# Patient Record
Sex: FEMALE | Race: OTHER | HISPANIC OR LATINO | URBAN - METROPOLITAN AREA
[De-identification: names, ages, dates, MRNs, and addresses within clinical notes are randomized per-mention and may not be internally consistent; named-entity substitution may affect disease eponyms.]

---

## 2024-08-01 ENCOUNTER — OUTPATIENT (OUTPATIENT)
Dept: OUTPATIENT SERVICES | Facility: HOSPITAL | Age: 64
LOS: 1 days | End: 2024-08-01
Payer: MEDICAID

## 2024-08-01 VITALS
OXYGEN SATURATION: 97 % | HEART RATE: 82 BPM | SYSTOLIC BLOOD PRESSURE: 121 MMHG | RESPIRATION RATE: 18 BRPM | DIASTOLIC BLOOD PRESSURE: 79 MMHG | WEIGHT: 162.04 LBS | TEMPERATURE: 98 F | HEIGHT: 64 IN

## 2024-08-01 DIAGNOSIS — M75.121 COMPLETE ROTATOR CUFF TEAR OR RUPTURE OF RIGHT SHOULDER, NOT SPECIFIED AS TRAUMATIC: ICD-10-CM

## 2024-08-01 DIAGNOSIS — Z01.818 ENCOUNTER FOR OTHER PREPROCEDURAL EXAMINATION: ICD-10-CM

## 2024-08-01 DIAGNOSIS — Z98.890 OTHER SPECIFIED POSTPROCEDURAL STATES: Chronic | ICD-10-CM

## 2024-08-01 DIAGNOSIS — S46.011A STRAIN OF MUSCLE(S) AND TENDON(S) OF THE ROTATOR CUFF OF RIGHT SHOULDER, INITIAL ENCOUNTER: ICD-10-CM

## 2024-08-01 DIAGNOSIS — I10 ESSENTIAL (PRIMARY) HYPERTENSION: ICD-10-CM

## 2024-08-01 DIAGNOSIS — R73.03 PREDIABETES: ICD-10-CM

## 2024-08-01 DIAGNOSIS — M54.50 LOW BACK PAIN, UNSPECIFIED: ICD-10-CM

## 2024-08-01 DIAGNOSIS — M51.26 OTHER INTERVERTEBRAL DISC DISPLACEMENT, LUMBAR REGION: ICD-10-CM

## 2024-08-01 DIAGNOSIS — M17.0 BILATERAL PRIMARY OSTEOARTHRITIS OF KNEE: ICD-10-CM

## 2024-08-01 NOTE — H&P PST ADULT - PROBLEM SELECTOR PLAN 3
Pt instructed to avoid NSAIDs 1 week before surgery, to take Tylenol as needed for pain.   Pt to follow-up with provider for management.  Stated understanding of instructions given.

## 2024-08-01 NOTE — H&P PST ADULT - NSANTHOSAYNRD_GEN_A_CORE
No. BOY screening performed.  STOP BANG Legend: 0-2 = LOW Risk; 3-4 = INTERMEDIATE Risk; 5-8 = HIGH Risk

## 2024-08-01 NOTE — H&P PST ADULT - HEMATOLOGY/LYMPHATICS
Patient discharged in stable condition to group home staff. Provided with discharge paperwork and home medications returned to patient[facility] All belongings accounted for.   negative

## 2024-08-01 NOTE — H&P PST ADULT - ASSESSMENT
This is a 64 year old  female with PMH of HTN, prediabetes-last HgA1C 6.4 in December 2023, OA of knees, lumbar herniated discs, COVID-19 virus infection in August 2023-fully recovered, who presents with complete rotator cuff tear of right shoulder not traumatic. Pt is scheduled for right shoulder arthroscopy on 8/8/2024.  BOY stop bang score 3. Pt denies history of BOY, never did sleep study.

## 2024-08-01 NOTE — H&P PST ADULT - PROBLEM SELECTOR PLAN 4
Pt c/o low back pain due to herniated disc.  Pt instructed to avoid NSAIDs 1 week before surgery, to take Tylenol as needed for pain.   Pt to follow-up with provider for management.  Stated understanding of instructions given.

## 2024-08-01 NOTE — H&P PST ADULT - HISTORY OF PRESENT ILLNESS
This is a 64 year old female with PMH of HTN, OA of knees, COVID-19 virus infection in August 2023-fully recovered, who presents with c/o right shoulder pain due to complete rotator cuff tear. Pt reports worsening of pain with arm movement and with heavy lifting. Pt for right shoulder arthroscopy on 8/8/2024.     This is a 64 year old  female with PMH of HTN, prediabetes-last HgA1C 6.4 in December 2023, OA of knees, lumbar herniated discs, COVID-19 virus infection in August 2023-fully recovered, who presents with c/o right shoulder pain due to complete rotator cuff tear. Pt reports worsening of pain with arm movement and with heavy lifting. Pt is scheduled for right shoulder arthroscopy on 8/8/2024.

## 2024-08-01 NOTE — H&P PST ADULT - NSICDXPASTMEDICALHX_GEN_ALL_CORE_FT
PAST MEDICAL HISTORY:  Complete rotator cuff tear or rupture of right shoulder, not specified as traumatic     HTN (hypertension)      PAST MEDICAL HISTORY:  Complete rotator cuff tear or rupture of right shoulder, not specified as traumatic     HTN (hypertension)     Lumbar herniated disc     Osteoarthritis of both knees     Prediabetes

## 2024-08-01 NOTE — H&P PST ADULT - PROBLEM SELECTOR PLAN 5
Hemoglobin A1C 6.4 in December 2023-seen on pt's portal-will obtain report from PCP.  Perioperative glucose monitoring and coverage as needed.   Follow-up with PCP for diabetic management.

## 2024-08-01 NOTE — H&P PST ADULT - PROBLEM SELECTOR PLAN 1
Pt is scheduled for right shoulder arthroscopy on 8/8/2024.  BOY stop bang score 3. Pt denies history of BOY, never did sleep study.  Preoperative instructions discussed with pt via Angolan speaking son Harpreet Lentz and pt's son. Uzbek copy of instructions given to pt.   Instructed pt that she will need someone to escort her home after surgery, to notify security when she arrives in the lobby of the hospital that she is here for surgery, not to eat or drink anything after midnight the night before the surgery, to avoid NSAIDs such as Ibuprofen, Motrin, aleve, Advil, naproxen before surgery, to take Tylenol if needed for pain, to report if she has been exposed to any one with any contagious diseases including Covid-19 or if she is exhibiting any symptoms of COVID-19. Instructed about use of Chlorhexidine 4% soap 3 days before surgery including the morning of surgery. Verbalized understanding of instructions given.

## 2024-08-01 NOTE — H&P PST ADULT - MUSCULOSKELETAL COMMENTS
c/o pain in knees, left ankle pain c/o pain in knees, left ankle pain; c/o low back pain due to herniated disc

## 2024-08-01 NOTE — H&P PST ADULT - PROBLEM SELECTOR PLAN 2
Optimized for surgery by PCP.  Instructed to continue antihypertensive medication-Amlodipine and take it with sips of water on day of surgery.   Follow-up with PCP for management.

## 2024-08-01 NOTE — H&P PST ADULT - NSICDXPASTSURGICALHX_GEN_ALL_CORE_FT
PAST SURGICAL HISTORY:  History of umbilical hernia repair      PAST SURGICAL HISTORY:  History of facial surgery     History of umbilical hernia repair

## 2024-08-02 PROCEDURE — G0463: CPT

## 2024-08-08 ENCOUNTER — OUTPATIENT (OUTPATIENT)
Dept: OUTPATIENT SERVICES | Facility: HOSPITAL | Age: 64
LOS: 1 days | End: 2024-08-08
Payer: MEDICAID

## 2024-08-08 VITALS
TEMPERATURE: 98 F | SYSTOLIC BLOOD PRESSURE: 137 MMHG | HEART RATE: 81 BPM | OXYGEN SATURATION: 96 % | HEIGHT: 64 IN | WEIGHT: 162.04 LBS | RESPIRATION RATE: 16 BRPM | DIASTOLIC BLOOD PRESSURE: 79 MMHG

## 2024-08-08 VITALS
SYSTOLIC BLOOD PRESSURE: 105 MMHG | RESPIRATION RATE: 16 BRPM | OXYGEN SATURATION: 97 % | HEART RATE: 80 BPM | DIASTOLIC BLOOD PRESSURE: 60 MMHG | TEMPERATURE: 98 F

## 2024-08-08 DIAGNOSIS — Z98.890 OTHER SPECIFIED POSTPROCEDURAL STATES: Chronic | ICD-10-CM

## 2024-08-08 DIAGNOSIS — M75.121 COMPLETE ROTATOR CUFF TEAR OR RUPTURE OF RIGHT SHOULDER, NOT SPECIFIED AS TRAUMATIC: ICD-10-CM

## 2024-08-08 DIAGNOSIS — Z01.818 ENCOUNTER FOR OTHER PREPROCEDURAL EXAMINATION: ICD-10-CM

## 2024-08-08 LAB
GLUCOSE BLDC GLUCOMTR-MCNC: 108 MG/DL — HIGH (ref 70–99)
GLUCOSE BLDC GLUCOMTR-MCNC: 157 MG/DL — HIGH (ref 70–99)

## 2024-08-08 PROCEDURE — 29827 SHO ARTHRS SRG RT8TR CUF RPR: CPT | Mod: RT

## 2024-08-08 PROCEDURE — 82962 GLUCOSE BLOOD TEST: CPT

## 2024-08-08 PROCEDURE — 0232T NJX PLATELET PLASMA: CPT

## 2024-08-08 PROCEDURE — 29826 SHO ARTHRS SRG DECOMPRESSION: CPT | Mod: RT

## 2024-08-08 PROCEDURE — 29999 UNLISTED PX ARTHROSCOPY: CPT

## 2024-08-08 PROCEDURE — C1713: CPT

## 2024-08-08 DEVICE — IMP OPUS MULTIFIXS 5.5MM: Type: IMPLANTABLE DEVICE | Site: RIGHT | Status: FUNCTIONAL

## 2024-08-08 DEVICE — IMPLANTABLE DEVICE: Type: IMPLANTABLE DEVICE | Site: RIGHT | Status: FUNCTIONAL

## 2024-08-08 RX ORDER — HYDROMORPHONE HCL IN 0.9% NACL 0.2 MG/ML
0.5 PLASTIC BAG, INJECTION (ML) INTRAVENOUS ONCE
Refills: 0 | Status: DISCONTINUED | OUTPATIENT
Start: 2024-08-08 | End: 2024-08-08

## 2024-08-08 RX ORDER — AMLODIPINE BESYLATE 2.5 MG/1
1 TABLET ORAL
Refills: 0 | DISCHARGE

## 2024-08-08 RX ORDER — FENTANYL CITRATE 1200 UG/1
50 LOZENGE ORAL; TRANSMUCOSAL
Refills: 0 | Status: DISCONTINUED | OUTPATIENT
Start: 2024-08-08 | End: 2024-08-08

## 2024-08-08 RX ORDER — ACETAMINOPHEN 500 MG
650 TABLET ORAL EVERY 6 HOURS
Refills: 0 | Status: ACTIVE | OUTPATIENT
Start: 2024-08-08 | End: 2024-08-09

## 2024-08-08 RX ORDER — IBUPROFEN 200 MG
2 TABLET ORAL
Refills: 0 | DISCHARGE

## 2024-08-08 RX ORDER — CELECOXIB 200 MG/1
200 CAPSULE ORAL ONCE
Refills: 0 | Status: COMPLETED | OUTPATIENT
Start: 2024-08-08 | End: 2024-08-08

## 2024-08-08 RX ORDER — NAPROXEN 250 MG
1 TABLET ORAL
Refills: 0 | DISCHARGE

## 2024-08-08 RX ORDER — OXYCODONE HYDROCHLORIDE 30 MG/1
2.5 TABLET ORAL EVERY 4 HOURS
Refills: 0 | Status: DISCONTINUED | OUTPATIENT
Start: 2024-08-08 | End: 2024-08-08

## 2024-08-08 RX ORDER — ONDANSETRON HCL/PF 4 MG/2 ML
4 VIAL (ML) INJECTION EVERY 6 HOURS
Refills: 0 | Status: ACTIVE | OUTPATIENT
Start: 2024-08-08 | End: 2025-07-07

## 2024-08-08 RX ORDER — OXYCODONE AND ACETAMINOPHEN 10; 325 MG/1; MG/1
1 TABLET ORAL
Qty: 0 | Refills: 0 | DISCHARGE

## 2024-08-08 RX ORDER — ACETAMINOPHEN 500 MG
2 TABLET ORAL
Refills: 0 | DISCHARGE

## 2024-08-08 RX ORDER — ACETAMINOPHEN 500 MG
975 TABLET ORAL ONCE
Refills: 0 | Status: COMPLETED | OUTPATIENT
Start: 2024-08-08 | End: 2024-08-08

## 2024-08-08 RX ORDER — OXYCODONE HYDROCHLORIDE 30 MG/1
5 TABLET ORAL EVERY 6 HOURS
Refills: 0 | Status: DISCONTINUED | OUTPATIENT
Start: 2024-08-08 | End: 2024-08-08

## 2024-08-08 RX ORDER — ASPIRIN 500 MG
1 TABLET ORAL
Qty: 0 | Refills: 0 | DISCHARGE

## 2024-08-08 RX ORDER — NALOXEGOL OXALATE 25 MG/1
1 TABLET, FILM COATED ORAL
Qty: 0 | Refills: 0 | DISCHARGE

## 2024-08-08 RX ORDER — ONDANSETRON HCL/PF 4 MG/2 ML
1 VIAL (ML) INJECTION
Qty: 0 | Refills: 0 | DISCHARGE

## 2024-08-08 RX ORDER — FENTANYL CITRATE 1200 UG/1
25 LOZENGE ORAL; TRANSMUCOSAL
Refills: 0 | Status: DISCONTINUED | OUTPATIENT
Start: 2024-08-08 | End: 2024-08-08

## 2024-08-08 RX ADMIN — Medication 4 MILLIGRAM(S): at 13:12

## 2024-08-08 RX ADMIN — FENTANYL CITRATE 50 MICROGRAM(S): 1200 LOZENGE ORAL; TRANSMUCOSAL at 11:56

## 2024-08-08 RX ADMIN — Medication 975 MILLIGRAM(S): at 07:47

## 2024-08-08 RX ADMIN — Medication 0.5 MILLIGRAM(S): at 12:41

## 2024-08-08 RX ADMIN — CELECOXIB 200 MILLIGRAM(S): 200 CAPSULE ORAL at 07:47

## 2024-08-08 RX ADMIN — FENTANYL CITRATE 50 MICROGRAM(S): 1200 LOZENGE ORAL; TRANSMUCOSAL at 12:11

## 2024-08-08 RX ADMIN — Medication 0.5 MILLIGRAM(S): at 12:56

## 2024-08-08 NOTE — ASU DISCHARGE PLAN (ADULT/PEDIATRIC) - PROCEDURE
Right shoulder arthroscopy with rotator cuff repair Right shoulder arthroscopy with rotator cuff repair & biceps tenotomy

## 2024-08-08 NOTE — ASU DISCHARGE PLAN (ADULT/PEDIATRIC) - ASU DC SPECIAL INSTRUCTIONSFT
Keep Dressing clean, dry, and intact   Non Weight Bearing to right upper extremity until f/u with Dr. Waters  Sling in place at all times to right upper extremity   Patient is to follow up with Orthopedic Surgeon, Dr. Waters in office in ONE WEEK at: [phone]    If patient has drainage from the wound, please contact the surgeon's office.   If patient has intractable pain, please contact the surgeon's office.   If excessively warm at the incision site is noted, please contact the surgeon's office.   If redness is noted, especially spreading, please contact the surgeon's office.     If patient has fever over 101F please return to the hospital through the Emergency Room.   If kenyatta blood is saturating the dressing, please return to the hospital through the Emergency Room.  If drainage of fluid or pus is noted, please return to the hospital through the Emergency Room.

## 2024-08-08 NOTE — ASU DISCHARGE PLAN (ADULT/PEDIATRIC) - NS MD DC FALL RISK RISK
For information on Fall & Injury Prevention, visit: [email or web address] OR  [email or web address] OR  https://www.cdc.gov/steadi/patient.html

## 2024-08-08 NOTE — ASU DISCHARGE PLAN (ADULT/PEDIATRIC) - CARE PROVIDER_API CALL
Donnie Waters  Orthopaedic Surgery  [address] [place] [id number]  Phone: [phone]  Fax: [phone]  Follow Up Time: 1 week

## 2024-08-08 NOTE — ASU PATIENT PROFILE, ADULT - NSICDXPASTMEDICALHX_GEN_ALL_CORE_FT
PAST MEDICAL HISTORY:  Complete rotator cuff tear or rupture of right shoulder, not specified as traumatic     HTN (hypertension)     Lumbar herniated disc     Osteoarthritis of both knees     Prediabetes

## 2024-08-08 NOTE — BRIEF OPERATIVE NOTE - NSICDXBRIEFPROCEDURE_GEN_ALL_CORE_FT
PROCEDURES:  Arthroscopy with open repair of rotator cuff of right shoulder 08-Aug-2024 11:35:18  Luisito Neal

## 2025-04-28 NOTE — H&P PST ADULT - NS PRO LACT YNNA
Advance Care Planning    Advance Care Planning Clinical Specialist  Conversation Note    Date of ACP Conversation: 4/28/2025    ACP Clinical Specialist: ALEXANDER AYALA    Referral Date:4/16/2025    Received by: PCP    Conversation Conducted with: Patient with decision making capacity      Current Designated Decision Maker/s:        Care Preferences Communicated    Code Status:  If the patient's heart were to stop beating, in their present state of health, the patient's CPR Preference: Yes, attempt CPR    If their health worsens and it becomes clear that the chance of recovery is unlikely, the patient's CPR Preference: No, allow a natural death (No CPR)     Ventilator:  If the patient, in their present state of health, suddenly became very ill and unable to breathe on their own, the patient desires the use of a ventilator (intubation).    If their health worsens and it becomes clear that the chance of recovery is unlikely, the patient does not desire the use of a ventilator (intubation).    [x] Yes  [] No   Educated Patient / Decision Maker regarding differences between advance directives and portable DNR orders.    Conversation Summary:   Arrived at the patient's residence and obtained verbal consent for home visit obtained. ACP conversation with alert and oriented patient completed. Discussed advance directives, early decision making and goals of care. Patient stated that at end of life she would want comfort-based care. Would agree to use of ventilator with likely recovery. For poor prognosis or for long term she would not agree to Ventilator. Would not want tube feeding and CPR with underlying medical conditions or for long term.     Reviewed with the patient her previously completed ACP documents from 2009.  Patient has a completed [place] DDND. This is copied and will be uploaded to the patients medical record. She also has a [place] HCPOA. This document has agents and other information that needs updating and or  no

## (undated) DEVICE — Device

## (undated) DEVICE — POSITIONER FOAM MATTRESS PAD CONVOLUTED (PINK)

## (undated) DEVICE — SYS  PLATELET AUTOLOGOUS FIBRIN

## (undated) DEVICE — DRSG COMBINE 5X9"

## (undated) DEVICE — TUBING DEPUY MITEK FMS OUTFLOW

## (undated) DEVICE — PACK SHOULDER ARTHROSCOPY

## (undated) DEVICE — SOL IRR POUR NS 0.9% 1500ML

## (undated) DEVICE — VENODYNE/SCD SLEEVE CALF MEDIUM

## (undated) DEVICE — SUT FIBERWIRE #2 38" STRAND 1 BLUE T-5 TAPER

## (undated) DEVICE — SUT MONOSOF 3-0 18" P-12

## (undated) DEVICE — S&N ARTHROCARE WAND COBLATION WEREWOLF FLOW 90

## (undated) DEVICE — WARMING BLANKET LOWER ADULT

## (undated) DEVICE — SUT POLYSORB 1 18" HOS-10

## (undated) DEVICE — SUT LASSO 90 DEGREE CURVE STRAIGHT

## (undated) DEVICE — SHAVER BLADE S&N FULL RADIUS BONECUTTER PLATINUM 4.5MM (YELLOW)

## (undated) DEVICE — DRAPE 1/2 SHEET 40X57"

## (undated) DEVICE — SHAVER BLADE S&N INCISOR 4.5MM STRAIGHT (LIME GREEN)

## (undated) DEVICE — TUBING DEPUY MITEK FMS INFLOW

## (undated) DEVICE — SUT NDL MAYO CATGUT 1/2 CIRCLE TAPER POINT 0.050" X 1.716"

## (undated) DEVICE — SUT POLYSORB 2-0 30" V-20 UNDYED

## (undated) DEVICE — S&N ARTHROCARE WAND TURBOVAC 90 DEGREE

## (undated) DEVICE — CANNULA ARTHREX TWIST IN NO SQUIRT CAP 7X7 PURPLE

## (undated) DEVICE — DRSG CURITY GAUZE SPONGE 4 X 4" 12-PLY

## (undated) DEVICE — ELCTR GROUNDING PAD ADULT COVIDIEN

## (undated) DEVICE — BUR S&N ACROMIONIZER ELITE 5.5MM STRAIGHT (BROWN)

## (undated) DEVICE — SOL IRR LR 3000ML